# Patient Record
Sex: FEMALE | Race: WHITE | NOT HISPANIC OR LATINO | URBAN - METROPOLITAN AREA
[De-identification: names, ages, dates, MRNs, and addresses within clinical notes are randomized per-mention and may not be internally consistent; named-entity substitution may affect disease eponyms.]

---

## 2023-09-22 ENCOUNTER — WEB ENCOUNTER (OUTPATIENT)
Dept: URBAN - METROPOLITAN AREA CLINIC 105 | Facility: CLINIC | Age: 68
End: 2023-09-22

## 2023-09-22 ENCOUNTER — OFFICE VISIT (OUTPATIENT)
Dept: URBAN - METROPOLITAN AREA CLINIC 105 | Facility: CLINIC | Age: 68
End: 2023-09-22
Payer: COMMERCIAL

## 2023-09-22 ENCOUNTER — LAB OUTSIDE AN ENCOUNTER (OUTPATIENT)
Dept: URBAN - METROPOLITAN AREA CLINIC 105 | Facility: CLINIC | Age: 68
End: 2023-09-22

## 2023-09-22 VITALS
HEIGHT: 64 IN | WEIGHT: 124.8 LBS | BODY MASS INDEX: 21.31 KG/M2 | SYSTOLIC BLOOD PRESSURE: 112 MMHG | HEART RATE: 82 BPM | TEMPERATURE: 97.7 F | DIASTOLIC BLOOD PRESSURE: 73 MMHG

## 2023-09-22 DIAGNOSIS — Z98.84 HISTORY OF GASTRIC BYPASS: ICD-10-CM

## 2023-09-22 DIAGNOSIS — K58.2 IRRITABLE BOWEL SYNDROME WITH ALTERNATING BOWEL HABITS: ICD-10-CM

## 2023-09-22 DIAGNOSIS — K31.84 GASTROPARESIS: ICD-10-CM

## 2023-09-22 DIAGNOSIS — Z83.71 FAMILY HISTORY OF COLONIC POLYPS: ICD-10-CM

## 2023-09-22 PROBLEM — 235675006: Status: ACTIVE | Noted: 2023-09-22

## 2023-09-22 PROCEDURE — 99204 OFFICE O/P NEW MOD 45 MIN: CPT | Performed by: INTERNAL MEDICINE

## 2023-09-22 RX ORDER — NABUMETONE 500 MG/1
TAKE 1 TABLET BY MOUTH TWICE A DAY TABLET ORAL
Qty: 60 EACH | Refills: 1 | Status: ACTIVE | COMMUNITY

## 2023-09-22 RX ORDER — METOPROLOL TARTRATE 25 MG/1
TABLET, FILM COATED ORAL
Qty: 90 TABLET | Status: ACTIVE | COMMUNITY

## 2023-09-22 RX ORDER — PREDNISONE 2.5 MG/1
TABLET ORAL
Qty: 30 TABLET | Status: ACTIVE | COMMUNITY

## 2023-09-22 RX ORDER — LEVOTHYROXINE SODIUM 25 UG/1
1 CAPSULE IN THE MORNING ON AN EMPTY STOMACH CAPSULE ORAL ONCE A DAY
Qty: 30 | Status: ACTIVE | COMMUNITY
Start: 2023-09-22

## 2023-09-22 RX ORDER — DENOSUMAB 60 MG/ML
AS DIRECTED INJECTION SUBCUTANEOUS
Status: ACTIVE | COMMUNITY
Start: 2023-09-22

## 2023-09-22 RX ORDER — AMITRIPTYLINE HYDROCHLORIDE 10 MG/1
TABLET, FILM COATED ORAL
Qty: 180 TABLET | Status: ACTIVE | COMMUNITY

## 2023-09-22 RX ORDER — FLUTICASONE PROPIONATE AND SALMETEROL 55; 14 UG/1; UG/1
POWDER, METERED RESPIRATORY (INHALATION)
Qty: 3 EACH | Status: ACTIVE | COMMUNITY

## 2023-09-22 RX ORDER — AMITRIPTYLINE HYDROCHLORIDE 10 MG/1
2 TABLETS TABLET, FILM COATED ORAL
Qty: 180 | Refills: 1 | OUTPATIENT

## 2023-09-22 NOTE — HPI-TODAY'S VISIT:
Today, she says she continues on amitriptyline 10 mg 2 pills QHS without issue. She went to a metabolic specialist for allergy testing - avoiding foods from that testing has been helpful for bloating, "inflammation," indigestion, regurgitation, and abdominal cramping. She alternates between constipation and diarrhea. She associates diarrhea with a cleanout after a bout of constipation. She can go weeks to a month without a cleanout. When she travels, she could go up to a week without a BM. Outside of traveling, she goes up to 3-4 days w/o a BM. Her stools can look like "stuck together marbles." She uses Psyllium 2 tbsp QD, daily prunes, daily vegetables. She still has constipation on this regimen, but better. She tried Miralax 1 cap QD, but d/c because she started having incontinence. Her last BM was 1-1.5 days ago. Cologuard last fall was negative. She could not tolerate the large job of colon prep for his last procedure. She used to avoid fiber for gastroparesis, but thinks introducing fiber back into his diet has helped.  She's had 5 brain surgeries at [facility] in 2019. She has autonomic dysfunction - intermittent tachycardia - which is controlled on metoprolol 25 mg QD.

## 2023-09-30 ENCOUNTER — WEB ENCOUNTER (OUTPATIENT)
Dept: URBAN - METROPOLITAN AREA CLINIC 105 | Facility: CLINIC | Age: 68
End: 2023-09-30

## 2023-10-24 ENCOUNTER — OFFICE VISIT (OUTPATIENT)
Dept: URBAN - METROPOLITAN AREA CLINIC 105 | Facility: CLINIC | Age: 68
End: 2023-10-24
Payer: COMMERCIAL

## 2023-10-24 VITALS
WEIGHT: 128.2 LBS | TEMPERATURE: 97.2 F | HEART RATE: 81 BPM | HEIGHT: 64 IN | DIASTOLIC BLOOD PRESSURE: 81 MMHG | SYSTOLIC BLOOD PRESSURE: 124 MMHG | BODY MASS INDEX: 21.89 KG/M2

## 2023-10-24 DIAGNOSIS — Z98.84 HISTORY OF GASTRIC BYPASS: ICD-10-CM

## 2023-10-24 DIAGNOSIS — K58.2 IRRITABLE BOWEL SYNDROME WITH ALTERNATING BOWEL HABITS: ICD-10-CM

## 2023-10-24 DIAGNOSIS — K31.84 GASTROPARESIS: ICD-10-CM

## 2023-10-24 PROCEDURE — 99213 OFFICE O/P EST LOW 20 MIN: CPT | Performed by: INTERNAL MEDICINE

## 2023-10-24 RX ORDER — METAXALONE 800 MG/1
1 TABLET TABLET ORAL THREE TIMES A DAY
Status: ACTIVE | COMMUNITY

## 2023-10-24 RX ORDER — AMITRIPTYLINE HYDROCHLORIDE 10 MG/1
2 TABLETS TABLET, FILM COATED ORAL
Qty: 180 | Refills: 1 | Status: ACTIVE | COMMUNITY

## 2023-10-24 RX ORDER — DENOSUMAB 60 MG/ML
AS DIRECTED INJECTION SUBCUTANEOUS
Status: ACTIVE | COMMUNITY
Start: 2023-09-22

## 2023-10-24 RX ORDER — PREDNISONE 2.5 MG/1
TABLET ORAL
Qty: 30 TABLET | Status: ACTIVE | COMMUNITY

## 2023-10-24 RX ORDER — NABUMETONE 500 MG/1
TAKE 1 TABLET BY MOUTH TWICE A DAY TABLET ORAL
Qty: 60 EACH | Refills: 1 | Status: ACTIVE | COMMUNITY

## 2023-10-24 RX ORDER — FLUTICASONE PROPIONATE AND SALMETEROL 55; 14 UG/1; UG/1
POWDER, METERED RESPIRATORY (INHALATION)
Qty: 3 EACH | Status: ACTIVE | COMMUNITY

## 2023-10-24 RX ORDER — METOPROLOL TARTRATE 25 MG/1
TABLET, FILM COATED ORAL
Qty: 90 TABLET | Status: ACTIVE | COMMUNITY

## 2023-10-24 RX ORDER — LEVOTHYROXINE SODIUM 25 UG/1
1 CAPSULE IN THE MORNING ON AN EMPTY STOMACH CAPSULE ORAL ONCE A DAY
Qty: 30 | Status: ACTIVE | COMMUNITY
Start: 2023-09-22

## 2023-10-24 NOTE — HPI-TODAY'S VISIT:
On 9/22/23, she said she continued on amitriptyline 10 mg 2 pills QHS without issue. She went to a metabolic specialist for allergy testing - avoiding foods from that testing had been helpful for bloating, "inflammation," indigestion, regurgitation, and abdominal cramping. She alternated between constipation and diarrhea. She associated diarrhea with a cleanout after a bout of constipation. She could go weeks to a month without a cleanout. When she traveled, she could go up to a week without a BM. Outside of traveling, she went up to 3-4 days w/o a BM. Her stools could look like "stuck together marbles." She used Psyllium 2 tbsp QD, daily prunes, daily vegetables. She still had constipation on this regimen, but better. She tried Miralax 1 cap QD, but d/c because she started having incontinence. Her last BM was 1-1.5 days ago. Cologuard last fall was negative. She could not tolerate the large colon prep for her last procedure. She used to avoid fiber for gastroparesis, but thought introducing fiber back into her diet has helped.  She's had 5 brain surgeries at [facility] in 2019. She had autonomic dysfunction - intermittent tachycardia - which was controlled on metoprolol 25 mg QD.  HPI: Today, she says she used 3-4 caps of Miralax on Sept 24 which produced results on the 27th and 28th. On those 2 days, she had unformed, urgent, "smeary" stools and had to wear "protection" at work as a result. She is now taking Psyllium Fiber daily which she feels like gives her BMs more "bulk" than Miralax. If she has not had a BM for a couple of days, she would take 1 cap of Miralax, like she did on Oct 5th, but her stools became "marbles" by Oct 8th so she took Colace. Once she took Colace, she had more frequent BMs 2 days later. She has some smear stools with urination she states.   Patient's stool diary from past week:  Oct 17,18,19 - no BM taking fiberdaily Oct 20 - fiber AM, Miralax PM, did not have a BM but feels like she needs a BM.  Oct 21 - 1st bm 1 hr afte rwaking up and then several more urgent unformed BMs. Oct 22 - normal AM BM Oct 23 - normal AM BM

## 2023-10-26 ENCOUNTER — DASHBOARD ENCOUNTERS (OUTPATIENT)
Age: 68
End: 2023-10-26

## 2023-10-28 PROBLEM — 14760008: Status: ACTIVE | Noted: 2023-09-22

## 2023-10-28 PROBLEM — 440630006: Status: ACTIVE | Noted: 2023-09-22

## 2024-01-24 ENCOUNTER — OFFICE VISIT (OUTPATIENT)
Dept: URBAN - METROPOLITAN AREA CLINIC 105 | Facility: CLINIC | Age: 69
End: 2024-01-24

## 2024-04-03 ENCOUNTER — OV EP (OUTPATIENT)
Dept: URBAN - METROPOLITAN AREA CLINIC 105 | Facility: CLINIC | Age: 69
End: 2024-04-03

## 2024-08-16 ENCOUNTER — ERX REFILL RESPONSE (OUTPATIENT)
Dept: URBAN - METROPOLITAN AREA CLINIC 105 | Facility: CLINIC | Age: 69
End: 2024-08-16

## 2024-08-16 RX ORDER — AMITRIPTYLINE HYDROCHLORIDE 10 MG/1
2 TABLETS TABLET, FILM COATED ORAL
Qty: 180 | Refills: 1 | OUTPATIENT

## 2024-08-16 RX ORDER — AMITRIPTYLINE HYDROCHLORIDE 10 MG/1
TAKE 2 TABLETS BY MOUTH EVERY DAY AT BEDTIME TABLET, FILM COATED ORAL
Qty: 180 TABLET | Refills: 1 | OUTPATIENT

## 2024-08-23 ENCOUNTER — OFFICE VISIT (OUTPATIENT)
Dept: URBAN - METROPOLITAN AREA CLINIC 105 | Facility: CLINIC | Age: 69
End: 2024-08-23
Payer: COMMERCIAL

## 2024-08-23 VITALS
HEIGHT: 64 IN | DIASTOLIC BLOOD PRESSURE: 78 MMHG | HEART RATE: 97 BPM | TEMPERATURE: 97 F | SYSTOLIC BLOOD PRESSURE: 122 MMHG | WEIGHT: 119.4 LBS | BODY MASS INDEX: 20.38 KG/M2

## 2024-08-23 DIAGNOSIS — K31.84 GASTROPARESIS: ICD-10-CM

## 2024-08-23 DIAGNOSIS — R19.7 OVERFLOW DIARRHEA: ICD-10-CM

## 2024-08-23 DIAGNOSIS — K59.09 CHANGE IN BOWEL MOVEMENTS INTERMITTENT CONSTIPATION. URGENCY IN THE MORNING.: ICD-10-CM

## 2024-08-23 DIAGNOSIS — K58.1 IRRITABLE BOWEL SYNDROME WITH CONSTIPATION: ICD-10-CM

## 2024-08-23 PROCEDURE — 99214 OFFICE O/P EST MOD 30 MIN: CPT | Performed by: INTERNAL MEDICINE

## 2024-08-23 RX ORDER — METOPROLOL TARTRATE 25 MG/1
TABLET, FILM COATED ORAL
Qty: 90 TABLET | Status: ACTIVE | COMMUNITY

## 2024-08-23 RX ORDER — DENOSUMAB 60 MG/ML
AS DIRECTED INJECTION SUBCUTANEOUS
Status: ACTIVE | COMMUNITY
Start: 2023-09-22

## 2024-08-23 RX ORDER — FLUTICASONE PROPIONATE AND SALMETEROL 55; 14 UG/1; UG/1
POWDER, METERED RESPIRATORY (INHALATION)
Qty: 3 EACH | Status: ACTIVE | COMMUNITY

## 2024-08-23 RX ORDER — METAXALONE 800 MG/1
1 TABLET TABLET ORAL THREE TIMES A DAY
Status: ACTIVE | COMMUNITY

## 2024-08-23 RX ORDER — LEVOTHYROXINE SODIUM 25 UG/1
1 CAPSULE IN THE MORNING ON AN EMPTY STOMACH CAPSULE ORAL ONCE A DAY
Qty: 30 | Status: ACTIVE | COMMUNITY
Start: 2023-09-22

## 2024-08-23 RX ORDER — NABUMETONE 500 MG/1
TAKE 1 TABLET BY MOUTH TWICE A DAY TABLET ORAL
Qty: 60 EACH | Refills: 1 | Status: ACTIVE | COMMUNITY

## 2024-08-23 RX ORDER — AMITRIPTYLINE HYDROCHLORIDE 10 MG/1
TAKE 2 TABLETS BY MOUTH EVERY DAY AT BEDTIME TABLET, FILM COATED ORAL
Qty: 180 TABLET | Refills: 1 | Status: ACTIVE | COMMUNITY

## 2024-08-23 NOTE — HPI-TODAY'S VISIT:
On 9/22/23, she said she continued on amitriptyline 10 mg 2 pills QHS without issue. She went to a metabolic specialist for allergy testing - avoiding foods from that testing had been helpful for bloating, "inflammation," indigestion, regurgitation, and abdominal cramping. She alternated between constipation and diarrhea. She associated diarrhea with a cleanout after a bout of constipation. She could go weeks to a month without a cleanout. When she traveled, she could go up to a week without a BM. Outside of traveling, she went up to 3-4 days w/o a BM. Her stools could look like "stuck together marbles." She used Psyllium 2 tbsp QD, daily prunes, daily vegetables. She still had constipation on this regimen, but better. She tried Miralax 1 cap QD, but d/c because she started having incontinence. Her last BM was 1-1.5 days ago. Cologuard last fall was negative. She could not tolerate the large colon prep for her last procedure. She used to avoid fiber for gastroparesis, but thought introducing fiber back into her diet has helped.  She's had 5 brain surgeries at [facility] in 2019. She had autonomic dysfunction - intermittent tachycardia - which was controlled on metoprolol 25 mg QD.  On 10/24/23, she said she used 3-4 caps of Miralax on Sept 24 which produced results on the 27th and 28th. On those 2 days, she had unformed, urgent, "smeary" stools and had to wear "protection" at work as a result. She was now taking Psyllium Fiber daily which she felt like gave her BMs more "bulk" than Miralax. If she had not had a BM for a couple of days, she would take 1 cap of Miralax, like she did on Oct 5th, but her stools became "marbles" by Oct 8th so she took Colace. Once she took Colace, she had more frequent BMs 2 days later. She had some smear stools with urination she stated.   Patient's stool diary from past week:  Oct 17,18,19 - no BM taking fiberdaily Oct 20 - fiber AM, Miralax PM, did not have a BM but feels like she needs a BM.  Oct 21 - 1st bm 1 hr afte rwaking up and then several more urgent unformed BMs. Oct 22 - normal AM BM Oct 23 - normal AM BM  HPI Today, she says she is doing well. She avoids food triggers. She is taking psyllium fiber daily and has an almost daily BM. She thinks she may have external hemorrhoids - has previously felt and saw external protrusion which looked like a pimple with a white head. Hemorrhoid cream was not working, but a triple abx cream worked. She currently feels an external protrusion again. She's had colonoscopies at [facility] since 2011 with Dr. Olivares. She recently did Cologuard and believes it was negative.

## 2024-08-23 NOTE — PHYSICAL EXAM GASTROINTESTINAL
Abdomen non distended.  RECTAL - no perianal abnormality; digit exam normal except large amount of stool in the vault prevented a thorough digit exam.  Patient noted she had not had a BM yet today.

## 2024-08-26 PROBLEM — 15241006: Status: ACTIVE | Noted: 2024-08-26

## 2025-02-19 ENCOUNTER — OFFICE VISIT (OUTPATIENT)
Dept: URBAN - METROPOLITAN AREA CLINIC 105 | Facility: CLINIC | Age: 70
End: 2025-02-19
Payer: MEDICARE

## 2025-02-19 VITALS
HEIGHT: 64 IN | HEART RATE: 96 BPM | SYSTOLIC BLOOD PRESSURE: 121 MMHG | TEMPERATURE: 96.8 F | DIASTOLIC BLOOD PRESSURE: 61 MMHG | WEIGHT: 190 LBS | BODY MASS INDEX: 32.44 KG/M2

## 2025-02-19 DIAGNOSIS — Z98.84 HISTORY OF GASTRIC BYPASS: ICD-10-CM

## 2025-02-19 DIAGNOSIS — K58.1 IRRITABLE BOWEL SYNDROME WITH CONSTIPATION: ICD-10-CM

## 2025-02-19 DIAGNOSIS — R19.7 OVERFLOW DIARRHEA: ICD-10-CM

## 2025-02-19 DIAGNOSIS — K59.00 CONSTIPATION, UNSPECIFIED CONSTIPATION TYPE: ICD-10-CM

## 2025-02-19 DIAGNOSIS — Z83.71 FAMILY HISTORY OF COLONIC POLYPS: ICD-10-CM

## 2025-02-19 DIAGNOSIS — K31.84 GASTROPARESIS: ICD-10-CM

## 2025-02-19 DIAGNOSIS — G90.9 AUTONOMIC DYSFUNCTION: ICD-10-CM

## 2025-02-19 PROCEDURE — 99214 OFFICE O/P EST MOD 30 MIN: CPT | Performed by: INTERNAL MEDICINE

## 2025-02-19 RX ORDER — DENOSUMAB 60 MG/ML
AS DIRECTED INJECTION SUBCUTANEOUS
Status: ACTIVE | COMMUNITY
Start: 2023-09-22

## 2025-02-19 RX ORDER — FLUTICASONE PROPIONATE AND SALMETEROL XINAFOATE 115; 21 UG/1; UG/1
PUFF AEROSOL, METERED RESPIRATORY (INHALATION) DAILY
Status: ACTIVE | COMMUNITY

## 2025-02-19 RX ORDER — LEVOTHYROXINE SODIUM 50 UG/1
1 CAPSULE IN THE MORNING ON AN EMPTY STOMACH CAPSULE ORAL ONCE A DAY
Qty: 30 | Status: ACTIVE | COMMUNITY
Start: 2023-09-22

## 2025-02-19 RX ORDER — METOPROLOL TARTRATE 25 MG/1
TABLET, FILM COATED ORAL
Qty: 90 TABLET | Status: ACTIVE | COMMUNITY

## 2025-02-19 RX ORDER — METAXALONE 800 MG/1
1 TABLET TABLET ORAL
Status: ACTIVE | COMMUNITY

## 2025-02-19 RX ORDER — LIOTHYRONINE SODIUM 5 UG/1
TAKE 1 TABLET BY MOUTH EVERY DAY IN THE MORNING TABLET ORAL
Qty: 90 EACH | Refills: 0 | Status: ACTIVE | COMMUNITY

## 2025-02-19 RX ORDER — NABUMETONE 500 MG/1
TAKE 1 TABLET BY MOUTH TWICE A DAY TABLET ORAL
Qty: 60 EACH | Refills: 1 | Status: ACTIVE | COMMUNITY

## 2025-02-19 RX ORDER — AMITRIPTYLINE HYDROCHLORIDE 10 MG/1
TAKE 3 TABLETS BY MOUTH EVERY DAY AT BEDTIME TABLET, FILM COATED ORAL
Refills: 1 | Status: ACTIVE | COMMUNITY

## 2025-02-19 NOTE — HPI-TODAY'S VISIT:
On 9/22/23, she said she continued on amitriptyline 10 mg 2 pills QHS without issue. She went to a metabolic specialist for allergy testing - avoiding foods from that testing had been helpful for bloating, "inflammation," indigestion, regurgitation, and abdominal cramping. She alternated between constipation and diarrhea. She associated diarrhea with a cleanout after a bout of constipation. She could go weeks to a month without a cleanout. When she traveled, she could go up to a week without a BM. Outside of traveling, she went up to 3-4 days w/o a BM. Her stools could look like "stuck together marbles." She used Psyllium 2 tbsp QD, daily prunes, daily vegetables. She still had constipation on this regimen, but better. She tried Miralax 1 cap QD, but d/c because she started having incontinence. Her last BM was 1-1.5 days ago. Cologuard last fall was negative. She could not tolerate the large colon prep for her last procedure. She used to avoid fiber for gastroparesis, but thought introducing fiber back into her diet has helped.  She's had 5 brain surgeries at [facility] in 2019. She had autonomic dysfunction - intermittent tachycardia - which was controlled on metoprolol 25 mg QD.  On 10/24/23, she said she used 3-4 caps of Miralax on Sept 24 which produced results on the 27th and 28th. On those 2 days, she had unformed, urgent, "smeary" stools and had to wear "protection" at work as a result. She was now taking Psyllium Fiber daily which she felt like gave her BMs more "bulk" than Miralax. If she had not had a BM for a couple of days, she would take 1 cap of Miralax, like she did on Oct 5th, but her stools became "marbles" by Oct 8th so she took Colace. Once she took Colace, she had more frequent BMs 2 days later. She had some smear stools with urination she stated.   Patient's stool diary from past week:  Oct 17,18,19 - no BM taking fiberdaily Oct 20 - fiber AM, Miralax PM, did not have a BM but feels like she needs a BM.  Oct 21 - 1st bm 1 hr afte rwaking up and then several more urgent unformed BMs. Oct 22 - normal AM BM Oct 23 - normal AM BM  On 8/23/24, she said she was doing well. She avoided food triggers. She was taking psyllium fiber daily and had an almost daily BM. She thought she may have external hemorrhoids - had previously felt and saw external protrusion which looked like a pimple with a white head. Hemorrhoid cream was not working, but a triple abx cream worked. She currently felt an external protrusion again. She had colonoscopies at [facility] since 2011 with Dr. Olivares. She recently did Cologuard and believed it was negative.  HPI Today, she says she is using Psyllium fiber, drinks a lot of water, eats prunes daily, and is on a high fiber diet. On this regimen, she has intermittent bouts of looser, more urgent, more frequet BMs. She can have these episodes even when she feels like she's been having regular BMs otherwise. Currently in the midst of another bout she states. She has intermittent incontinence and wears a diaper. She had incontinence all the time while on Miralax she states.  She saw her rheumatologist on [place] 10 and labs were done. She says she rheum thinks she may have another autoimmune disease. She is due for another cologuard test she states.

## 2025-02-22 ENCOUNTER — WEB ENCOUNTER (OUTPATIENT)
Dept: URBAN - METROPOLITAN AREA CLINIC 105 | Facility: CLINIC | Age: 70
End: 2025-02-22

## 2025-03-26 ENCOUNTER — OFFICE VISIT (OUTPATIENT)
Dept: URBAN - METROPOLITAN AREA CLINIC 105 | Facility: CLINIC | Age: 70
End: 2025-03-26
Payer: MEDICARE

## 2025-03-26 DIAGNOSIS — K31.84 GASTROPARESIS: ICD-10-CM

## 2025-03-26 DIAGNOSIS — K58.1 IRRITABLE BOWEL SYNDROME WITH CONSTIPATION: ICD-10-CM

## 2025-03-26 DIAGNOSIS — R19.7 OVERFLOW DIARRHEA: ICD-10-CM

## 2025-03-26 DIAGNOSIS — M51.369 DEGENERATION OF INTERVERTEBRAL DISC OF LUMBAR REGION, UNSPECIFIED WHETHER PAIN PRESENT: ICD-10-CM

## 2025-03-26 DIAGNOSIS — Z98.84 HISTORY OF GASTRIC BYPASS: ICD-10-CM

## 2025-03-26 DIAGNOSIS — K59.00 CONSTIPATION, UNSPECIFIED CONSTIPATION TYPE: ICD-10-CM

## 2025-03-26 DIAGNOSIS — Z83.71 FAMILY HISTORY OF COLONIC POLYPS: ICD-10-CM

## 2025-03-26 DIAGNOSIS — G90.9 AUTONOMIC DYSFUNCTION: ICD-10-CM

## 2025-03-26 DIAGNOSIS — M51.34 DDD (DEGENERATIVE DISC DISEASE), THORACIC: ICD-10-CM

## 2025-03-26 PROCEDURE — 99214 OFFICE O/P EST MOD 30 MIN: CPT | Performed by: INTERNAL MEDICINE

## 2025-03-26 RX ORDER — AMITRIPTYLINE HYDROCHLORIDE 10 MG/1
TAKE 3 TABLETS BY MOUTH EVERY DAY AT BEDTIME TABLET, FILM COATED ORAL
Refills: 1 | Status: ACTIVE | COMMUNITY

## 2025-03-26 RX ORDER — LUBIPROSTONE 8 UG/1
1 -2 CAPSULES WITH FOOD AND WATER CAPSULE, GELATIN COATED ORAL DAILY
Qty: 60 | Refills: 2 | OUTPATIENT
Start: 2025-03-26 | End: 2025-06-24

## 2025-03-26 RX ORDER — LEVOTHYROXINE SODIUM 50 UG/1
1 CAPSULE IN THE MORNING ON AN EMPTY STOMACH CAPSULE ORAL ONCE A DAY
Qty: 30 | Status: ACTIVE | COMMUNITY
Start: 2023-09-22

## 2025-03-26 RX ORDER — METOPROLOL TARTRATE 25 MG/1
TABLET, FILM COATED ORAL
Qty: 90 TABLET | Status: ACTIVE | COMMUNITY

## 2025-03-26 RX ORDER — DENOSUMAB 60 MG/ML
AS DIRECTED INJECTION SUBCUTANEOUS
Status: ACTIVE | COMMUNITY
Start: 2023-09-22

## 2025-03-26 RX ORDER — FLUTICASONE PROPIONATE AND SALMETEROL XINAFOATE 115; 21 UG/1; UG/1
PUFF AEROSOL, METERED RESPIRATORY (INHALATION) DAILY
Status: ACTIVE | COMMUNITY

## 2025-03-26 RX ORDER — LIOTHYRONINE SODIUM 5 UG/1
TAKE 1 TABLET BY MOUTH EVERY DAY IN THE MORNING TABLET ORAL
Qty: 90 EACH | Refills: 0 | Status: ACTIVE | COMMUNITY

## 2025-03-26 RX ORDER — METAXALONE 800 MG/1
1 TABLET TABLET ORAL
Status: ACTIVE | COMMUNITY

## 2025-03-26 RX ORDER — NABUMETONE 500 MG/1
TAKE 1 TABLET BY MOUTH TWICE A DAY TABLET ORAL
Qty: 60 EACH | Refills: 1 | Status: ACTIVE | COMMUNITY

## 2025-03-26 NOTE — HPI-TODAY'S VISIT:
On 9/22/23, she said she continued on amitriptyline 10 mg 2 pills QHS without issue. She went to a metabolic specialist for allergy testing - avoiding foods from that testing had been helpful for bloating, "inflammation," indigestion, regurgitation, and abdominal cramping. She alternated between constipation and diarrhea. She associated diarrhea with a cleanout after a bout of constipation. She could go weeks to a month without a cleanout. When she traveled, she could go up to a week without a BM. Outside of traveling, she went up to 3-4 days w/o a BM. Her stools could look like "stuck together marbles." She used Psyllium 2 tbsp QD, daily prunes, daily vegetables. She still had constipation on this regimen, but better. She tried Miralax 1 cap QD, but d/c because she started having incontinence. Her last BM was 1-1.5 days ago. Cologuard last fall was negative. She could not tolerate the large colon prep for her last procedure. She used to avoid fiber for gastroparesis, but thought introducing fiber back into her diet has helped.  She's had 5 brain surgeries at [facility] in 2019. She had autonomic dysfunction - intermittent tachycardia - which was controlled on metoprolol 25 mg QD.  On 10/24/23, she said she used 3-4 caps of Miralax on Sept 24 which produced results on the 27th and 28th. On those 2 days, she had unformed, urgent, "smeary" stools and had to wear "protection" at work as a result. She was now taking Psyllium Fiber daily which she felt like gave her BMs more "bulk" than Miralax. If she had not had a BM for a couple of days, she would take 1 cap of Miralax, like she did on Oct 5th, but her stools became "marbles" by Oct 8th so she took Colace. Once she took Colace, she had more frequent BMs 2 days later. She had some smear stools with urination she stated.   Patient's stool diary from past week:  Oct 17,18,19 - no BM taking fiberdaily Oct 20 - fiber AM, Miralax PM, did not have a BM but feels like she needs a BM.  Oct 21 - 1st bm 1 hr afte rwaking up and then several more urgent unformed BMs. Oct 22 - normal AM BM Oct 23 - normal AM BM  On 8/23/24, she said she was doing well. She avoided food triggers. She was taking psyllium fiber daily and had an almost daily BM. She thought she may have external hemorrhoids - had previously felt and saw external protrusion which looked like a pimple with a white head. Hemorrhoid cream was not working, but a triple abx cream worked. She currently felt an external protrusion again. She had colonoscopies at [facility] since 2011 with Dr. Olivares. She recently did Cologuard and believed it was negative.  On 2/19/25, she said she was using Psyllium fiber, drank a lot of water, ate prunes daily, and was on a high fiber diet. On this regimen, she had intermittent bouts of looser, more urgent, more frequet BMs. She could have these episodes even when she felt like she had been having regular BMs otherwise. Currently in the midst of another bout she stated. She had intermittent incontinence and wore a diaper. She had incontinence all the time while on Miralax she stated.  She saw her rheumatologist on [place] 10 and labs were done. She said the rheum thought she may have another autoimmune disease. She was due for another cologuard test she stated.  HPI Today, she says most days she is happy with her BMs, but occasionally can be uncontrollable. Stools can be formed or "cowpie" formation. She is taking the Colace 1 pill QD, psyllium daily, prunes daily, and prune juice daily. She has not trialed 2 pills of Colace in a day. She has a BM Q1-3D, usually daily or QOD. Rarely does she have more than one BM in a day. Stools usually soft. Mostly incomplete evac. Occasional hard stools with strain - 1x/week. She can have urgency - can result either in low-volume or more output. Urgency can interfere with her life. She is taking amitriptyline 30 mg QD for her autonomic disorder.

## 2025-03-27 PROBLEM — 68675004: Status: ACTIVE | Noted: 2025-03-27

## 2025-04-17 ENCOUNTER — ERX REFILL RESPONSE (OUTPATIENT)
Dept: URBAN - METROPOLITAN AREA CLINIC 105 | Facility: CLINIC | Age: 70
End: 2025-04-17

## 2025-04-17 RX ORDER — LUBIPROSTONE 8 UG/1
1 -2 CAPSULES WITH FOOD AND WATER ORALLY DAILY CAPSULE, GELATIN COATED ORAL
Qty: 180 CAPSULE | Refills: 1 | OUTPATIENT

## 2025-04-17 RX ORDER — LUBIPROSTONE 8 UG/1
1 -2 CAPSULES WITH FOOD AND WATER CAPSULE, GELATIN COATED ORAL DAILY
Qty: 60 | Refills: 2 | OUTPATIENT

## 2025-05-14 ENCOUNTER — OFFICE VISIT (OUTPATIENT)
Dept: URBAN - METROPOLITAN AREA CLINIC 105 | Facility: CLINIC | Age: 70
End: 2025-05-14
Payer: MEDICARE

## 2025-05-14 DIAGNOSIS — Z98.84 HISTORY OF GASTRIC BYPASS: ICD-10-CM

## 2025-05-14 DIAGNOSIS — R19.7 OVERFLOW DIARRHEA: ICD-10-CM

## 2025-05-14 DIAGNOSIS — K59.00 CONSTIPATION, UNSPECIFIED CONSTIPATION TYPE: ICD-10-CM

## 2025-05-14 DIAGNOSIS — Z83.71 FAMILY HISTORY OF COLONIC POLYPS: ICD-10-CM

## 2025-05-14 DIAGNOSIS — M51.34 DDD (DEGENERATIVE DISC DISEASE), THORACIC: ICD-10-CM

## 2025-05-14 DIAGNOSIS — G90.9 AUTONOMIC DYSFUNCTION: ICD-10-CM

## 2025-05-14 DIAGNOSIS — K58.1 IRRITABLE BOWEL SYNDROME WITH CONSTIPATION: ICD-10-CM

## 2025-05-14 DIAGNOSIS — M51.369 DEGENERATION OF INTERVERTEBRAL DISC OF LUMBAR REGION, UNSPECIFIED WHETHER PAIN PRESENT: ICD-10-CM

## 2025-05-14 DIAGNOSIS — K31.84 GASTROPARESIS: ICD-10-CM

## 2025-05-14 PROCEDURE — 99213 OFFICE O/P EST LOW 20 MIN: CPT | Performed by: INTERNAL MEDICINE

## 2025-05-14 RX ORDER — LUBIPROSTONE 8 UG/1
1 -2 CAPSULES WITH FOOD AND WATER ORALLY DAILY CAPSULE, GELATIN COATED ORAL
Qty: 180 CAPSULE | Refills: 1 | Status: ACTIVE | COMMUNITY

## 2025-05-14 RX ORDER — METOPROLOL TARTRATE 25 MG/1
TABLET, FILM COATED ORAL
Qty: 90 TABLET | Status: ACTIVE | COMMUNITY

## 2025-05-14 RX ORDER — LIOTHYRONINE SODIUM 5 UG/1
TAKE 1 TABLET BY MOUTH EVERY DAY IN THE MORNING TABLET ORAL
Qty: 90 EACH | Refills: 0 | Status: ACTIVE | COMMUNITY

## 2025-05-14 RX ORDER — AMITRIPTYLINE HYDROCHLORIDE 10 MG/1
TAKE 3 TABLETS BY MOUTH EVERY DAY AT BEDTIME TABLET, FILM COATED ORAL
Refills: 1 | Status: ACTIVE | COMMUNITY

## 2025-05-14 RX ORDER — LUBIPROSTONE 8 UG/1
1 CAPSULE WITH FOOD AND WATER ORALLY TWICE A DAY CAPSULE, GELATIN COATED ORAL
Qty: 180 | Refills: 1 | OUTPATIENT

## 2025-05-14 RX ORDER — FLUTICASONE PROPIONATE AND SALMETEROL XINAFOATE 115; 21 UG/1; UG/1
PUFF AEROSOL, METERED RESPIRATORY (INHALATION) DAILY
Status: ACTIVE | COMMUNITY

## 2025-05-14 RX ORDER — DENOSUMAB 60 MG/ML
AS DIRECTED INJECTION SUBCUTANEOUS
Status: ACTIVE | COMMUNITY
Start: 2023-09-22

## 2025-05-14 RX ORDER — NABUMETONE 500 MG/1
TAKE 1 TABLET BY MOUTH TWICE A DAY TABLET ORAL
Qty: 60 EACH | Refills: 1 | Status: ACTIVE | COMMUNITY

## 2025-05-14 RX ORDER — METAXALONE 800 MG/1
1 TABLET TABLET ORAL
Status: ACTIVE | COMMUNITY

## 2025-05-14 RX ORDER — LEVOTHYROXINE SODIUM 50 UG/1
1 CAPSULE IN THE MORNING ON AN EMPTY STOMACH CAPSULE ORAL ONCE A DAY
Qty: 30 | Status: ACTIVE | COMMUNITY
Start: 2023-09-22

## 2025-05-14 NOTE — HPI-TODAY'S VISIT:
On 9/22/23, she said she continued on amitriptyline 10 mg 2 pills QHS without issue. She went to a metabolic specialist for allergy testing - avoiding foods from that testing had been helpful for bloating, "inflammation," indigestion, regurgitation, and abdominal cramping. She alternated between constipation and diarrhea. She associated diarrhea with a cleanout after a bout of constipation. She could go weeks to a month without a cleanout. When she traveled, she could go up to a week without a BM. Outside of traveling, she went up to 3-4 days w/o a BM. Her stools could look like "stuck together marbles." She used Psyllium 2 tbsp QD, daily prunes, daily vegetables. She still had constipation on this regimen, but better. She tried Miralax 1 cap QD, but d/c because she started having incontinence. Her last BM was 1-1.5 days ago. Cologuard last fall was negative. She could not tolerate the large colon prep for her last procedure. She used to avoid fiber for gastroparesis, but thought introducing fiber back into her diet has helped.  She's had 5 brain surgeries at [facility] in 2019. She had autonomic dysfunction - intermittent tachycardia - which was controlled on metoprolol 25 mg QD.  On 10/24/23, she said she used 3-4 caps of Miralax on Sept 24 which produced results on the 27th and 28th. On those 2 days, she had unformed, urgent, "smeary" stools and had to wear "protection" at work as a result. She was now taking Psyllium Fiber daily which she felt like gave her BMs more "bulk" than Miralax. If she had not had a BM for a couple of days, she would take 1 cap of Miralax, like she did on Oct 5th, but her stools became "marbles" by Oct 8th so she took Colace. Once she took Colace, she had more frequent BMs 2 days later. She had some smear stools with urination she stated.   Patient's stool diary from past week:  Oct 17,18,19 - no BM taking fiberdaily Oct 20 - fiber AM, Miralax PM, did not have a BM but feels like she needs a BM.  Oct 21 - 1st bm 1 hr afte rwaking up and then several more urgent unformed BMs. Oct 22 - normal AM BM Oct 23 - normal AM BM  On 8/23/24, she said she was doing well. She avoided food triggers. She was taking psyllium fiber daily and had an almost daily BM. She thought she may have external hemorrhoids - had previously felt and saw external protrusion which looked like a pimple with a white head. Hemorrhoid cream was not working, but a triple abx cream worked. She currently felt an external protrusion again. She had colonoscopies at [facility] since 2011 with Dr. Olivares. She recently did Cologuard and believed it was negative.  On 2/19/25, she said she was using Psyllium fiber, drank a lot of water, ate prunes daily, and was on a high fiber diet. On this regimen, she had intermittent bouts of looser, more urgent, more frequet BMs. She could have these episodes even when she felt like she had been having regular BMs otherwise. Currently in the midst of another bout she stated. She had intermittent incontinence and wore a diaper. She had incontinence all the time while on Miralax she stated.  She saw her rheumatologist on [place] 10 and labs were done. She said the rheum thought she may have another autoimmune disease. She was due for another cologuard test she stated.  On 3/26/25, she said most days she was happy with her BMs, but occasionally could be uncontrollable. Stools could be formed or "cowpie" formation. She was taking the Colace 1 pill QD, psyllium daily, prunes daily, and prune juice daily. She had not trialed 2 pills of Colace in a day. She had a BM Q1-3D, usually daily or QOD. Rarely did she have more than one BM in a day. Stools usually soft. Mostly incomplete evac. Occasional hard stools with strain - 1x/week. She could have urgency - could result either in low-volume or more output. Urgency could interfere with her life. She was taking amitriptyline 30 mg QD for her autonomic disorder.  HPI Today, she says she's noted a significant improvement in her bowel habit since starting on Amitiza 8 mcg. She takes 1 pill with dinner and has a BM the next morning with good evacuation. She also eats either a serving of prunes or serving of prune juice per day. Also on Psyllium daily. She is off Colace. Urgency has resolved. Now only has rare constipation.